# Patient Record
Sex: MALE | Race: ASIAN | NOT HISPANIC OR LATINO | URBAN - METROPOLITAN AREA
[De-identification: names, ages, dates, MRNs, and addresses within clinical notes are randomized per-mention and may not be internally consistent; named-entity substitution may affect disease eponyms.]

---

## 2018-12-28 ENCOUNTER — EMERGENCY (EMERGENCY)
Facility: HOSPITAL | Age: 23
LOS: 1 days | Discharge: HOME | End: 2018-12-28
Attending: EMERGENCY MEDICINE | Admitting: EMERGENCY MEDICINE
Payer: MEDICAID

## 2018-12-28 VITALS
HEART RATE: 85 BPM | OXYGEN SATURATION: 94 % | WEIGHT: 190.04 LBS | SYSTOLIC BLOOD PRESSURE: 130 MMHG | DIASTOLIC BLOOD PRESSURE: 82 MMHG | RESPIRATION RATE: 16 BRPM | TEMPERATURE: 98 F

## 2018-12-28 VITALS
RESPIRATION RATE: 18 BRPM | DIASTOLIC BLOOD PRESSURE: 82 MMHG | HEART RATE: 87 BPM | OXYGEN SATURATION: 98 % | TEMPERATURE: 98 F | SYSTOLIC BLOOD PRESSURE: 123 MMHG

## 2018-12-28 DIAGNOSIS — Z90.49 ACQUIRED ABSENCE OF OTHER SPECIFIED PARTS OF DIGESTIVE TRACT: Chronic | ICD-10-CM

## 2018-12-28 LAB
ALBUMIN SERPL ELPH-MCNC: 3.8 G/DL — SIGNIFICANT CHANGE UP (ref 3.3–5)
ALP SERPL-CCNC: 97 U/L — SIGNIFICANT CHANGE UP (ref 40–120)
ALT FLD-CCNC: 52 U/L — SIGNIFICANT CHANGE UP (ref 12–78)
ANION GAP SERPL CALC-SCNC: 7 MMOL/L — SIGNIFICANT CHANGE UP (ref 5–17)
APPEARANCE UR: CLEAR — SIGNIFICANT CHANGE UP
APTT BLD: 32.4 SEC — SIGNIFICANT CHANGE UP (ref 28.5–37)
AST SERPL-CCNC: 28 U/L — SIGNIFICANT CHANGE UP (ref 15–37)
BASOPHILS # BLD AUTO: 0.02 K/UL — SIGNIFICANT CHANGE UP (ref 0–0.2)
BASOPHILS NFR BLD AUTO: 0.3 % — SIGNIFICANT CHANGE UP (ref 0–2)
BILIRUB SERPL-MCNC: 0.5 MG/DL — SIGNIFICANT CHANGE UP (ref 0.2–1.2)
BILIRUB UR-MCNC: NEGATIVE — SIGNIFICANT CHANGE UP
BUN SERPL-MCNC: 10 MG/DL — SIGNIFICANT CHANGE UP (ref 7–23)
CALCIUM SERPL-MCNC: 8.2 MG/DL — LOW (ref 8.5–10.1)
CHLORIDE SERPL-SCNC: 106 MMOL/L — SIGNIFICANT CHANGE UP (ref 96–108)
CO2 SERPL-SCNC: 26 MMOL/L — SIGNIFICANT CHANGE UP (ref 22–31)
COLOR SPEC: YELLOW — SIGNIFICANT CHANGE UP
CREAT SERPL-MCNC: 0.87 MG/DL — SIGNIFICANT CHANGE UP (ref 0.5–1.3)
D DIMER BLD IA.RAPID-MCNC: <150 NG/ML DDU — SIGNIFICANT CHANGE UP
DIFF PNL FLD: NEGATIVE — SIGNIFICANT CHANGE UP
EOSINOPHIL # BLD AUTO: 0.07 K/UL — SIGNIFICANT CHANGE UP (ref 0–0.5)
EOSINOPHIL NFR BLD AUTO: 1.1 % — SIGNIFICANT CHANGE UP (ref 0–6)
GLUCOSE SERPL-MCNC: 93 MG/DL — SIGNIFICANT CHANGE UP (ref 70–99)
GLUCOSE UR QL: NEGATIVE — SIGNIFICANT CHANGE UP
HCT VFR BLD CALC: 44 % — SIGNIFICANT CHANGE UP (ref 39–50)
HGB BLD-MCNC: 14.9 G/DL — SIGNIFICANT CHANGE UP (ref 13–17)
IMM GRANULOCYTES NFR BLD AUTO: 0.2 % — SIGNIFICANT CHANGE UP (ref 0–1.5)
INR BLD: 1.05 RATIO — SIGNIFICANT CHANGE UP (ref 0.88–1.16)
KETONES UR-MCNC: NEGATIVE — SIGNIFICANT CHANGE UP
LEUKOCYTE ESTERASE UR-ACNC: NEGATIVE — SIGNIFICANT CHANGE UP
LYMPHOCYTES # BLD AUTO: 2.29 K/UL — SIGNIFICANT CHANGE UP (ref 1–3.3)
LYMPHOCYTES # BLD AUTO: 34.4 % — SIGNIFICANT CHANGE UP (ref 13–44)
MCHC RBC-ENTMCNC: 27.9 PG — SIGNIFICANT CHANGE UP (ref 27–34)
MCHC RBC-ENTMCNC: 33.9 GM/DL — SIGNIFICANT CHANGE UP (ref 32–36)
MCV RBC AUTO: 82.2 FL — SIGNIFICANT CHANGE UP (ref 80–100)
MONOCYTES # BLD AUTO: 0.79 K/UL — SIGNIFICANT CHANGE UP (ref 0–0.9)
MONOCYTES NFR BLD AUTO: 11.9 % — SIGNIFICANT CHANGE UP (ref 2–14)
NEUTROPHILS # BLD AUTO: 3.47 K/UL — SIGNIFICANT CHANGE UP (ref 1.8–7.4)
NEUTROPHILS NFR BLD AUTO: 52.1 % — SIGNIFICANT CHANGE UP (ref 43–77)
NITRITE UR-MCNC: NEGATIVE — SIGNIFICANT CHANGE UP
PH UR: 6 — SIGNIFICANT CHANGE UP (ref 5–8)
PLATELET # BLD AUTO: 214 K/UL — SIGNIFICANT CHANGE UP (ref 150–400)
POTASSIUM SERPL-MCNC: 3.5 MMOL/L — SIGNIFICANT CHANGE UP (ref 3.5–5.3)
POTASSIUM SERPL-SCNC: 3.5 MMOL/L — SIGNIFICANT CHANGE UP (ref 3.5–5.3)
PROT SERPL-MCNC: 7.3 G/DL — SIGNIFICANT CHANGE UP (ref 6–8.3)
PROT UR-MCNC: NEGATIVE — SIGNIFICANT CHANGE UP
PROTHROM AB SERPL-ACNC: 12 SEC — SIGNIFICANT CHANGE UP (ref 10–12.9)
RBC # BLD: 5.35 M/UL — SIGNIFICANT CHANGE UP (ref 4.2–5.8)
RBC # FLD: 13.1 % — SIGNIFICANT CHANGE UP (ref 10.3–14.5)
SODIUM SERPL-SCNC: 139 MMOL/L — SIGNIFICANT CHANGE UP (ref 135–145)
SP GR SPEC: 1.01 — SIGNIFICANT CHANGE UP (ref 1.01–1.02)
TROPONIN I SERPL-MCNC: <.015 NG/ML — SIGNIFICANT CHANGE UP (ref 0.01–0.04)
UROBILINOGEN FLD QL: NEGATIVE — SIGNIFICANT CHANGE UP
WBC # BLD: 6.65 K/UL — SIGNIFICANT CHANGE UP (ref 3.8–10.5)
WBC # FLD AUTO: 6.65 K/UL — SIGNIFICANT CHANGE UP (ref 3.8–10.5)

## 2018-12-28 PROCEDURE — 99285 EMERGENCY DEPT VISIT HI MDM: CPT

## 2018-12-28 PROCEDURE — 93005 ELECTROCARDIOGRAM TRACING: CPT

## 2018-12-28 PROCEDURE — 85610 PROTHROMBIN TIME: CPT

## 2018-12-28 PROCEDURE — 85379 FIBRIN DEGRADATION QUANT: CPT

## 2018-12-28 PROCEDURE — 36415 COLL VENOUS BLD VENIPUNCTURE: CPT

## 2018-12-28 PROCEDURE — 80053 COMPREHEN METABOLIC PANEL: CPT

## 2018-12-28 PROCEDURE — 85027 COMPLETE CBC AUTOMATED: CPT

## 2018-12-28 PROCEDURE — 99284 EMERGENCY DEPT VISIT MOD MDM: CPT | Mod: 25

## 2018-12-28 PROCEDURE — 96374 THER/PROPH/DIAG INJ IV PUSH: CPT

## 2018-12-28 PROCEDURE — 84484 ASSAY OF TROPONIN QUANT: CPT

## 2018-12-28 PROCEDURE — 85730 THROMBOPLASTIN TIME PARTIAL: CPT

## 2018-12-28 PROCEDURE — 71046 X-RAY EXAM CHEST 2 VIEWS: CPT

## 2018-12-28 PROCEDURE — 71046 X-RAY EXAM CHEST 2 VIEWS: CPT | Mod: 26

## 2018-12-28 PROCEDURE — 81003 URINALYSIS AUTO W/O SCOPE: CPT

## 2018-12-28 RX ORDER — KETOROLAC TROMETHAMINE 30 MG/ML
30 SYRINGE (ML) INJECTION ONCE
Qty: 0 | Refills: 0 | Status: DISCONTINUED | OUTPATIENT
Start: 2018-12-28 | End: 2018-12-28

## 2018-12-28 RX ADMIN — Medication 30 MILLIGRAM(S): at 07:49

## 2018-12-28 NOTE — CONSULT NOTE ADULT - ASSESSMENT
22 yo M with no pertinent PMHx presents to ED complaining of chest pain since yesterday. Patient had an 8hr long car ride from [place] to [place] but patient reports that pain started before car ride. Leaning forward exacerbated pain and sitting upright alleviated pain. Pt denies heavy lifting, recent trauma to the area, or any recent life stressors. He also reports L sided intermittent calf tenderness that lasts for 1-2 min. Pt states having weakness, nausea, dizziness, SOB yesterday but is no longer experiencing theses symptoms. Patient is not complaining of any cardiac symptoms at this time. No clear evidence of acute ischemia, trops negative x 1. EKG shows sinus rhythm with occasional PVC's which could explain his chest discomfort. No previous EKG to compare. Low risk for VTE as D-dimer negative and resolution of pain with Toradol.     - Follow up outpatient for ischemic heart disease work up although highly doubt pain is cardiac in origin as CE negx1  - Patient is stable to be discharged home from cardiology stand point 24 yo M with no pertinent PMHx presents to ED complaining of chest pain since yesterday. Patient had an 8hr long car ride from [place] to [place] but patient reports that pain started before car ride. Leaning forward exacerbated pain and sitting upright alleviated pain, so it was not typically pleuritic. Pt denies heavy lifting, recent trauma to the area, or any recent life stressors.     He also reports L sided intermittent calf tenderness that lasts for 1-2 min. Pt states having weakness, nausea, dizziness yesterday but is no longer experiencing theses symptoms. Patient is not complaining of any cardiac symptoms at this time. No clear evidence of acute ischemia, troponin negative x 1. EKG shows sinus rhythm with occasional PVC's which could explain his chest discomfort. No previous EKG to compare. Low risk for VTE as D-dimer negative and resolution of pain with Toradol.     - Doubt primary cardiac etiology of the pain. He is a very low risk patient overall (except his family history), and has a normal troponin and d-dimer  - Offered LE dopplers given his LE symptoms, though he has a flight to catch at 1:30 PM.  - He will follow up with his primary cardiologist when he returns. He needs to return to the ER if the pain worsens.  - Patient is stable to be discharged home from cardiology stand point

## 2018-12-28 NOTE — ED ADULT NURSE NOTE - OBJECTIVE STATEMENT
24 y/o M patient presents to ED from home c/o right sided, intermittent, non radiating chest pain and left sided chest pressure since last night. Patient denies ever having prior chest pain. Upon arrival to ED patient denies chest pain and reports only chest pressure. Patient A&Ox3. lungs CTA. skin warm and intact. abdomen soft and non tender. ambulatory. Patient denies HA, dizziness, SOB, chest pain, abdominal pain, N/V/D, bowel/bladder changes, fevers/chills. VSS. Safety and comfort measures provided and maintained. MD bedside.

## 2018-12-28 NOTE — ED PROVIDER NOTE - OBJECTIVE STATEMENT
22 yo male with no med hx co generalized chest pain since yesterday. Travelling by plane from [place] to [facility] yesterday. Denies fevr. cough, sob, pleuritic pain, back pain leg swelling or calf tenderness. Nonsmoker. Takes no meds.  PMD [place].   Father with Hx of MI

## 2018-12-28 NOTE — CONSULT NOTE ADULT - SUBJECTIVE AND OBJECTIVE BOX
[facility]         Vanessa Mcnamara, Kermit, Jason, Vishal, Len, Ursula        [phone] (office)    CHIEF COMPLAINT: Patient is a 23y old  Male who presents with a chief complaint of     HPI:  24 yo M with no pertinent PMHx presents to ED complaining of chest pain since yesterday. Patient had an 8hr long car ride from [place] to [place] but patient reports that pain started before car ride. He also reports L sided leg pain.     PAST MEDICAL & SURGICAL HISTORY:  No pertinent past medical history  S/P appendectomy      SOCIAL HISTORY: No active tobacco, alcohol or illicit drug use.    FAMILY HISTORY:      Home Medications:      MEDICATIONS  (STANDING):    MEDICATIONS  (PRN):      Allergies    No Known Allergies    Intolerances        REVIEW OF SYSTEMS: Is negative for eye, ENT, GI, , allergic, dermatologic, musculoskeletal and neurologic, except as described above.    VITAL SIGNS:   Vital Signs Last 24 Hrs  T(C): 36.8 (28 Dec 2018 07:05), Max: 36.8 (28 Dec 2018 07:05)  T(F): 98.2 (28 Dec 2018 07:05), Max: 98.2 (28 Dec 2018 07:05)  HR: 85 (28 Dec 2018 07:05) (85 - 85)  BP: 130/82 (28 Dec 2018 07:05) (130/82 - 130/82)  BP(mean): --  RR: 16 (28 Dec 2018 07:05) (16 - 16)  SpO2: 94% (28 Dec 2018 07:05) (94% - 94%)    I&O's Summary      PHYSICAL EXAM:  Constitutional: NAD, awake and alert, well-developed  Eyes: EOMI, no oral cyanosis, conjunctivae clear, anicteric  Pulmonary: Non-labored, breath sounds are clear bilaterally, no wheezing, rales or rhonchi  Cardiovascular: Irregular S1 and S2, in af, tachycardic normal rate. No murmur  Gastrointestinal: Bowel sounds present, soft, nontender  Lymph: No peripheral edema   Neurological: Alert, strength and sensitivity are grossly intact  Skin: Warm, well-perfused  Psych: Mood and affect appropriate    LABS: All Labs Reviewed:                        14.9   6.65  )-----------( 214      ( 28 Dec 2018 07:48 )             44.0     28 Dec 2018 07:48    139    |  106    |  10     ----------------------------<  93     3.5     |  26     |  0.87     Ca    8.2        28 Dec 2018 07:48    TPro  7.3    /  Alb  3.8    /  TBili  0.5    /  DBili  x      /  AST  28     /  ALT  52     /  AlkPhos  97     28 Dec 2018 07:48    PT/INR - ( 28 Dec 2018 07:48 )   PT: 12.0 sec;   INR: 1.05 ratio         PTT - ( 28 Dec 2018 07:48 )  PTT:32.4 sec  CARDIAC MARKERS ( 28 Dec 2018 07:48 )  <.015 ng/mL / x     / x     / x     / x          Blood Culture:         RADIOLOGY:    EKG: [facility]         Vanessa Mcnamara, Kermit, Jason, Vishal, Len, Ursula        [phone] (office)    CHIEF COMPLAINT: Patient is a 23y old  Male who presents with a chief complaint of     HPI:  24 yo M with no pertinent PMHx presents to ED complaining of chest pain since yesterday. Patient had an 8hr long car ride from [place] to [place] but patient reports that pain started before car ride. Leaning forward exacerbated pain and sitting upright alleviated pain. Pt denies heavy lifting, recent trauma to the area, or any recent life stressors. He also reports L sided intermittent calf tenderness that lasts for 1-2 min. Pt states having weakness, nausea, dizziness, SOB yesterday but is no longer experiencing theses symptoms.  Pt currently denies fever, N/V/D, SOB, tachypnea, tachycardia, cough.      PAST MEDICAL & SURGICAL HISTORY:  No pertinent past medical history  S/P appendectomy      SOCIAL HISTORY: No active tobacco, alcohol or illicit drug use.    FAMILY HISTORY:      Home Medications:      MEDICATIONS  (STANDING):    MEDICATIONS  (PRN):      Allergies    No Known Allergies    Intolerances        REVIEW OF SYSTEMS: Is negative for eye, ENT, GI, , allergic, dermatologic, musculoskeletal and neurologic, except as described above.    VITAL SIGNS:   Vital Signs Last 24 Hrs  T(C): 36.8 (28 Dec 2018 07:05), Max: 36.8 (28 Dec 2018 07:05)  T(F): 98.2 (28 Dec 2018 07:05), Max: 98.2 (28 Dec 2018 07:05)  HR: 85 (28 Dec 2018 07:05) (85 - 85)  BP: 130/82 (28 Dec 2018 07:05) (130/82 - 130/82)  BP(mean): --  RR: 16 (28 Dec 2018 07:05) (16 - 16)  SpO2: 94% (28 Dec 2018 07:05) (94% - 94%)    I&O's Summary      PHYSICAL EXAM:  Constitutional: NAD, Aox4, well-developed  Eyes: EOMI, no oral cyanosis, conjunctivae clear, anicteric  Pulmonary: Non-labored, breath sounds are clear bilaterally, no wheezing, rales or rhonchi  Cardiovascular: s1 s2 rrr no m/r/g  Gastrointestinal: Bowel sounds present, soft, nontender  Lymph: No peripheral edema   Neurological: Alert, strength and sensitivity are grossly intact  Skin: Warm, well-perfused  Psych: Mood and affect appropriate    LABS: All Labs Reviewed:                        14.9   6.65  )-----------( 214      ( 28 Dec 2018 07:48 )             44.0     28 Dec 2018 07:48    139    |  106    |  10     ----------------------------<  93     3.5     |  26     |  0.87     Ca    8.2        28 Dec 2018 07:48    TPro  7.3    /  Alb  3.8    /  TBili  0.5    /  DBili  x      /  AST  28     /  ALT  52     /  AlkPhos  97     28 Dec 2018 07:48    PT/INR - ( 28 Dec 2018 07:48 )   PT: 12.0 sec;   INR: 1.05 ratio         PTT - ( 28 Dec 2018 07:48 )  PTT:32.4 sec  CARDIAC MARKERS ( 28 Dec 2018 07:48 )  <.015 ng/mL / x     / x     / x     / x          Blood Culture:         RADIOLOGY:    EKG:   Ventricularrate 86   MA interval 144  QRS duration 102   QT/QTc 344/411   P-R-T 41 80 36  Sinus rhythm with occasional PVCs [facility]         Vanessa Mcnamara, Kermit, Jason, Vishal, Len, Ursula        [phone] (office)    CHIEF COMPLAINT: Patient is a 23y old  Male who presents with a chief complaint of     HPI:  24 yo M with no pertinent PMHx presents to ED complaining of chest pain since yesterday. Patient had an 8hr long car ride from [place] to [place] but patient reports that pain started before car ride. Leaning forward exacerbated pain and sitting upright alleviated pain. Pt denies heavy lifting, recent trauma to the area, or any recent life stressors. He also reports L sided intermittent calf tenderness that lasts for 1-2 min. Pt states having weakness, nausea, dizziness, SOB yesterday but is no longer experiencing theses symptoms.  Pt currently denies fever, N/V/D, SOB, tachypnea, tachycardia, cough.      PAST MEDICAL & SURGICAL HISTORY:  No pertinent past medical history  S/P appendectomy      SOCIAL HISTORY: No active tobacco, alcohol or illicit drug use.    FAMILY HISTORY:  + CAD in father    Home Medications:      MEDICATIONS  (STANDING):    MEDICATIONS  (PRN):      Allergies    No Known Allergies    Intolerances        REVIEW OF SYSTEMS: Is negative for eye, ENT, GI, , allergic, dermatologic, musculoskeletal and neurologic, except as described above.    VITAL SIGNS:   Vital Signs Last 24 Hrs  T(C): 36.8 (28 Dec 2018 07:05), Max: 36.8 (28 Dec 2018 07:05)  T(F): 98.2 (28 Dec 2018 07:05), Max: 98.2 (28 Dec 2018 07:05)  HR: 85 (28 Dec 2018 07:05) (85 - 85)  BP: 130/82 (28 Dec 2018 07:05) (130/82 - 130/82)  BP(mean): --  RR: 16 (28 Dec 2018 07:05) (16 - 16)  SpO2: 94% (28 Dec 2018 07:05) (94% - 94%)    I&O's Summary      PHYSICAL EXAM:  Constitutional: NAD, Aox4, well-developed  Eyes: EOMI, no oral cyanosis, conjunctivae clear, anicteric  Pulmonary: Non-labored, breath sounds are clear bilaterally, no wheezing, rales or rhonchi  Cardiovascular: s1 s2 rrr no m/r/g  Gastrointestinal: Bowel sounds present, soft, nontender  Lymph: No peripheral edema   Neurological: Alert, strength and sensitivity are grossly intact  Skin: Warm, well-perfused  Psych: Mood and affect appropriate    LABS: All Labs Reviewed:                        14.9   6.65  )-----------( 214      ( 28 Dec 2018 07:48 )             44.0     28 Dec 2018 07:48    139    |  106    |  10     ----------------------------<  93     3.5     |  26     |  0.87     Ca    8.2        28 Dec 2018 07:48    TPro  7.3    /  Alb  3.8    /  TBili  0.5    /  DBili  x      /  AST  28     /  ALT  52     /  AlkPhos  97     28 Dec 2018 07:48    PT/INR - ( 28 Dec 2018 07:48 )   PT: 12.0 sec;   INR: 1.05 ratio         PTT - ( 28 Dec 2018 07:48 )  PTT:32.4 sec  CARDIAC MARKERS ( 28 Dec 2018 07:48 )  <.015 ng/mL / x     / x     / x     / x          Blood Culture:         RADIOLOGY:    EKG:   Ventricularrate 86   OH interval 144  QRS duration 102   QT/QTc 344/411   P-R-T 41 80 36  Sinus rhythm with occasional PVCs

## 2020-01-28 NOTE — ED PROVIDER NOTE - DISCUSSED CLINICAL AND RADIOLOGICAL FINDINGS WITH, MDM
patient patient/family Doxycycline Counseling:  Patient counseled regarding possible photosensitivity and increased risk for sunburn.  Patient instructed to avoid sunlight, if possible.  When exposed to sunlight, patients should wear protective clothing, sunglasses, and sunscreen.  The patient was instructed to call the office immediately if the following severe adverse effects occur:  hearing changes, easy bruising/bleeding, severe headache, or vision changes.  The patient verbalized understanding of the proper use and possible adverse effects of doxycycline.  All of the patient's questions and concerns were addressed.

## 2022-09-08 ENCOUNTER — APPOINTMENT (OUTPATIENT)
Dept: ORTHOPEDIC SURGERY | Facility: CLINIC | Age: 27
End: 2022-09-08

## 2022-09-08 VITALS — WEIGHT: 190 LBS | BODY MASS INDEX: 30.53 KG/M2 | HEIGHT: 66 IN

## 2022-09-08 DIAGNOSIS — Z78.9 OTHER SPECIFIED HEALTH STATUS: ICD-10-CM

## 2022-09-08 PROBLEM — Z00.00 ENCOUNTER FOR PREVENTIVE HEALTH EXAMINATION: Status: ACTIVE | Noted: 2022-09-08

## 2022-09-08 PROCEDURE — 99204 OFFICE O/P NEW MOD 45 MIN: CPT

## 2022-09-08 NOTE — PHYSICAL EXAM
[Right] : right knee [5___] : hamstring 5[unfilled]/5 [Positive] : positive anterior draw [] : no deformities of patellar tendon [AP] : anteroposterior [Lateral] : lateral [Outside films reviewed] : Outside films reviewed [There are no fractures, subluxations or dislocations. No significant abnormalities are seen] : There are no fractures, subluxations or dislocations. No significant abnormalities are seen [de-identified] : PAIN MEDIAL  [TWNoteComboBox7] : flexion 120 degrees [de-identified] : extension 15 degrees

## 2022-09-08 NOTE — HISTORY OF PRESENT ILLNESS
[de-identified] : Date of Injury/Onset:      7/4/22\par NO PRIOR KNEE JOINT INJURIES PRIOR MEDIAL LACERATION NO SEQUELA\par Pain: At Rest:  1/10   \par With Activity:  /10 \par Affecting Sleep:YES\par Difficulty with stairs: YES\par Difficulty getting in and out of car: YES\par Sit to stand stiffness: NO\par Mechanism of injury:  LANDED WRONG ON TRAMPOLINE\par This is not a Work Related Injury being treated under Worker's Compensation.\par This  is not   an athletic injury occurring associated with an interscholastic or organized sports team.\par Quality of symptoms: C/O LATERAL SIDED PAIN IN QUAD AREA, INSTABILITY EPISODES\par Improves with:   REST \par Worse with:    BEING ON FEET FOR LONG PERIODS\par Previous Treatment/Imaging/Studies Since Last Visit: PT SINCE [id number]/, ADVIL FOR PAIN\par Reports Available For Review Today: \par Out of work/sport:      \par School/Sport/Position/Occupation:  \par \par \par  \par \par

## 2022-09-08 NOTE — DISCUSSION/SUMMARY
[de-identified] :  Lengthy discussion regarding options was had with the patient. Nonsurgical options including but not limited to  anti-inflammatory medications, activity modification,  non impact exercise /maintaining a healthy BMI, bracing, and icing were reviewed. Surgical options including but not limited to RIGHT KNEE Arthroscopy, MEDIAL MENISCUS REPAIR VS DEBRIDEMENT  ACL RECONSTRUCTION   were discussed as was risks, benefits and alternatives. All questions were answered. \par \par PATIENT HAS BEEN GOING TO PT FOR INCREASE OF ROM AND FUNCTION STILL C/O PAIN

## 2022-09-14 ENCOUNTER — FORM ENCOUNTER (OUTPATIENT)
Age: 27
End: 2022-09-14

## 2022-09-29 ENCOUNTER — APPOINTMENT (OUTPATIENT)
Dept: ORTHOPEDIC SURGERY | Facility: HOSPITAL | Age: 27
End: 2022-09-29

## 2022-09-29 PROCEDURE — 29881 ARTHRS KNE SRG MNISECTMY M/L: CPT | Mod: AS,RT

## 2022-09-29 PROCEDURE — 29888 ARTHRS AID ACL RPR/AGMNTJ: CPT | Mod: AS,RT

## 2022-09-29 PROCEDURE — 29888 ARTHRS AID ACL RPR/AGMNTJ: CPT | Mod: RT

## 2022-09-29 PROCEDURE — 29881 ARTHRS KNE SRG MNISECTMY M/L: CPT | Mod: RT

## 2022-10-04 ENCOUNTER — FORM ENCOUNTER (OUTPATIENT)
Age: 27
End: 2022-10-04

## 2022-10-07 ENCOUNTER — APPOINTMENT (OUTPATIENT)
Dept: ORTHOPEDIC SURGERY | Facility: CLINIC | Age: 27
End: 2022-10-07

## 2022-10-07 VITALS — WEIGHT: 190 LBS | BODY MASS INDEX: 30.53 KG/M2 | HEIGHT: 66 IN

## 2022-10-07 PROCEDURE — 99024 POSTOP FOLLOW-UP VISIT: CPT

## 2022-10-07 NOTE — DISCUSSION/SUMMARY
[de-identified] : patient is doing very well one week post op\par patient advised to participate in physical therapy to improve strength and ROM- ACL protocols  PWB \par patient f/u in 2-3 weeks\par \par Discussed importance of non-impact exercise and muscle stretching before and after exercise. Explained the importance of ice and rest.

## 2022-10-07 NOTE — PHYSICAL EXAM
[Right] : right knee [4___] : hamstring 4[unfilled]/5 [] : light touch is intact throughout [de-identified] : Burning sensation lateral incision  [de-identified] : Hinged Knee Brace\par  [TWNoteComboBox7] : flexion 90 degrees [de-identified] : extension 10 degrees

## 2022-10-07 NOTE — REASON FOR VISIT
[FreeTextEntry2] : here today for f/u 9/29/22 right knee ACL reconstruction and lateral menisectomy.  using crutches and brace, hydrocodone 1-2 at night\par using tylenol and ibuprofen.

## 2022-10-11 RX ORDER — HYDROCODONE BITARTRATE AND ACETAMINOPHEN 5; 325 MG/1; MG/1
5-325 TABLET ORAL
Qty: 20 | Refills: 0 | Status: ACTIVE | COMMUNITY
Start: 2022-09-29 | End: 1900-01-01

## 2022-10-27 ENCOUNTER — APPOINTMENT (OUTPATIENT)
Dept: ORTHOPEDIC SURGERY | Facility: CLINIC | Age: 27
End: 2022-10-27

## 2022-10-27 PROCEDURE — 99024 POSTOP FOLLOW-UP VISIT: CPT

## 2022-10-27 NOTE — REASON FOR VISIT
[FreeTextEntry2] : here today for f/u 9/29/22 right knee ACL reconstruction and lateral menisectomy.  using crutches and brace,  Going to PT and Doing well

## 2022-10-27 NOTE — DISCUSSION/SUMMARY
[de-identified] : Discussed importance of non-impact exercise and muscle stretching to help improve extension. Demonstrated proper way to ambulate with crutches.\par Patient can discontinue use of brace. Patient advised to continue physical therapy and work on straightening knee\par follow up 4 weeks

## 2022-10-27 NOTE — PHYSICAL EXAM
[Right] : right knee [5___] : hamstring 5[unfilled]/5 [] : ambulation with crutches [FreeTextEntry9] : extensor lag 10 degrees [de-identified] : Burning sensation lateral incision  [de-identified] : Hinged Knee Brace\par  [TWNoteComboBox7] : flexion 105 degrees [de-identified] : extension 10 degrees

## 2022-12-02 ENCOUNTER — APPOINTMENT (OUTPATIENT)
Dept: ORTHOPEDIC SURGERY | Facility: CLINIC | Age: 27
End: 2022-12-02

## 2022-12-02 VITALS — HEIGHT: 66 IN | WEIGHT: 190 LBS | BODY MASS INDEX: 30.53 KG/M2

## 2022-12-02 PROCEDURE — 99024 POSTOP FOLLOW-UP VISIT: CPT

## 2022-12-02 NOTE — DISCUSSION/SUMMARY
[de-identified] : Discussed importance of non-impact exercise and muscle stretching.\par Patient admits to not going to PT the past three weeks. Patient advised to continue physical therapy. ROM \par follow up 1-2 months with Dr Galan\par Advised pt to put mederna on scar and to wear sunscreen if exposed to sun

## 2022-12-02 NOTE — PHYSICAL EXAM
[Right] : right knee [5___] : hamstring 5[unfilled]/5 [] : patient ambulates without assistive device [FreeTextEntry9] : extensor lag 10 degrees [de-identified] : Burning sensation lateral incision  [de-identified] : Hinged Knee Brace\par  [TWNoteComboBox7] : flexion 120 degrees [de-identified] : extension 0 degrees

## 2022-12-02 NOTE — REASON FOR VISIT
[FreeTextEntry2] : here today for f/u 9/29/22 right knee ACL reconstruction and lateral menisectomy. Doing well, has no pain. Hasn't been to PT in 2-3 weeks. Denies pain medication.

## 2023-01-03 ENCOUNTER — NON-APPOINTMENT (OUTPATIENT)
Age: 28
End: 2023-01-03

## 2023-02-14 ENCOUNTER — APPOINTMENT (OUTPATIENT)
Dept: ORTHOPEDIC SURGERY | Facility: CLINIC | Age: 28
End: 2023-02-14
Payer: MEDICAID

## 2023-02-14 VITALS — WEIGHT: 190 LBS | BODY MASS INDEX: 30.53 KG/M2 | HEIGHT: 66 IN

## 2023-02-14 PROCEDURE — 99213 OFFICE O/P EST LOW 20 MIN: CPT

## 2023-02-14 NOTE — REASON FOR VISIT
[FreeTextEntry2] : here today for f/u 9/29/22 right knee ACL reconstruction and lateral menisectomy. Doing well, has no active pain. Admits to having difficulty climbing stairs. D/C PT on his own. Denies taking pain medication.

## 2023-02-14 NOTE — PHYSICAL EXAM
[Right] : right knee [5___] : hamstring 5[unfilled]/5 [NL (140)] : flexion 140 degrees [] : no calf tenderness [de-identified] : Burning sensation lateral incision  [de-identified] : Hinged Knee Brace\par  [TWNoteComboBox7] : flexion 120 degrees [de-identified] : extension 0 degrees

## 2023-02-14 NOTE — DISCUSSION/SUMMARY
[de-identified] : pt has good ROM but needs to work on strength \par pt was told he can start jogging\par Recommended strengthening exercises - advised pt to get into a routine of being active and strengthening multiple times a week\par Plan is for right ACL stabilizing brace to reintegrate athletic activities \par PT script provided for ACL evaluation in 6 weeks from now\par f/u 7 weeks

## 2023-04-04 ENCOUNTER — APPOINTMENT (OUTPATIENT)
Dept: ORTHOPEDIC SURGERY | Facility: CLINIC | Age: 28
End: 2023-04-04
Payer: MEDICAID

## 2023-04-04 VITALS — BODY MASS INDEX: 30.53 KG/M2 | WEIGHT: 190 LBS | HEIGHT: 66 IN

## 2023-04-04 DIAGNOSIS — R29.898 OTHER SYMPTOMS AND SIGNS INVOLVING THE MUSCULOSKELETAL SYSTEM: ICD-10-CM

## 2023-04-04 PROCEDURE — 99213 OFFICE O/P EST LOW 20 MIN: CPT

## 2023-04-04 NOTE — PHYSICAL EXAM
[Right] : right knee [NL (140)] : flexion 140 degrees [5___] : hamstring 5[unfilled]/5 [] : no calf tenderness [de-identified] : Burning sensation lateral incision  [de-identified] : Hinged Knee Brace\par  [de-identified] : extension 0 degrees

## 2023-04-04 NOTE — DISCUSSION/SUMMARY
[de-identified] : patient requires ACL stabilizing brace due to weakness in RLE to protect knee\par Need to work on strengthening and increasing activity to build strength , needs to get into a HEP to get stronger in leg\par Recommend stretches and non impact exercises for pt to do- patient shown some exercises to do\par f/u 3 mo\par PT RX given

## 2023-04-04 NOTE — REASON FOR VISIT
[FreeTextEntry2] : here today for f/u 9/29/22 right knee ACL reconstruction and lateral menisectomy,  no pain, doing stairs normal. no HEP.

## 2023-07-13 ENCOUNTER — APPOINTMENT (OUTPATIENT)
Dept: ORTHOPEDIC SURGERY | Facility: CLINIC | Age: 28
End: 2023-07-13
Payer: MEDICAID

## 2023-07-13 VITALS — WEIGHT: 190 LBS | BODY MASS INDEX: 30.53 KG/M2 | HEIGHT: 66 IN

## 2023-07-13 DIAGNOSIS — S83.229S: ICD-10-CM

## 2023-07-13 DIAGNOSIS — S83.231S COMPLEX TEAR OF MEDIAL MENISCUS, CURRENT INJURY, RIGHT KNEE, SEQUELA: ICD-10-CM

## 2023-07-13 PROCEDURE — 99213 OFFICE O/P EST LOW 20 MIN: CPT

## 2023-07-13 NOTE — REASON FOR VISIT
[FreeTextEntry2] : here today for f/u 9/29/22 right knee acl recon and lateral menisectomy.  no pain, feeling better.

## 2023-07-13 NOTE — PHYSICAL EXAM
[Right] : right knee [5___] : hamstring 5[unfilled]/5 [] : patient ambulates without assistive device [FreeTextEntry8] : Non tender at the hamstring graft site [FreeTextEntry9] : 0-145

## 2023-07-13 NOTE — DISCUSSION/SUMMARY
[de-identified] : Discussed importance of non-impact exercise and muscle stretching before and after exercise. Reviewed x-rays  Explained the importance of ice and rest.    Stretching exercises shown,  Explained the importance of Range of Motion before strength.\par \par Patient will return to PT and was given Rx for ACL Stabilizing brace\par \par

## 2023-07-29 ENCOUNTER — NON-APPOINTMENT (OUTPATIENT)
Age: 28
End: 2023-07-29

## 2023-10-12 ENCOUNTER — APPOINTMENT (OUTPATIENT)
Dept: ORTHOPEDIC SURGERY | Facility: CLINIC | Age: 28
End: 2023-10-12

## 2024-01-30 ENCOUNTER — APPOINTMENT (OUTPATIENT)
Dept: ORTHOPEDIC SURGERY | Facility: CLINIC | Age: 29
End: 2024-01-30
Payer: COMMERCIAL

## 2024-01-30 DIAGNOSIS — M76.31 ILIOTIBIAL BAND SYNDROME, RIGHT LEG: ICD-10-CM

## 2024-01-30 DIAGNOSIS — S83.231A COMPLEX TEAR OF MEDIAL MENISCUS, CURRENT INJURY, RIGHT KNEE, INITIAL ENCOUNTER: ICD-10-CM

## 2024-01-30 DIAGNOSIS — S83.511A SPRAIN OF ANTERIOR CRUCIATE LIGAMENT OF RIGHT KNEE, INITIAL ENCOUNTER: ICD-10-CM

## 2024-01-30 PROCEDURE — 73564 X-RAY EXAM KNEE 4 OR MORE: CPT | Mod: RT

## 2024-01-30 PROCEDURE — 99203 OFFICE O/P NEW LOW 30 MIN: CPT

## 2024-01-30 NOTE — PHYSICAL EXAM
[Right] : right knee [NL (140)] : flexion 140 degrees [NL (0)] : extension 0 degrees [5___] : hamstring 5[unfilled]/5 [] : patient ambulates without assistive device [FreeTextEntry3] : grade 1 effusion  [FreeTextEntry8] : ITB

## 2024-01-30 NOTE — DISCUSSION/SUMMARY
[de-identified] : Discussed with patient to manage pain at this time, stretch take NSAIDs PRN to relive pain.   f/u PRN

## 2024-01-30 NOTE — REASON FOR VISIT
[FreeTextEntry2] : Here for F/U Right Knee ACL reconstruction lateral  meniscus tear 9/29/22,  recent trip with a lot of walking   and swelling , Ibuprofen  for swelling